# Patient Record
Sex: FEMALE | Race: WHITE | NOT HISPANIC OR LATINO | URBAN - METROPOLITAN AREA
[De-identification: names, ages, dates, MRNs, and addresses within clinical notes are randomized per-mention and may not be internally consistent; named-entity substitution may affect disease eponyms.]

---

## 2017-08-02 ENCOUNTER — EMERGENCY (EMERGENCY)
Facility: HOSPITAL | Age: 62
LOS: 1 days | Discharge: ANOTHER HOSPITAL | End: 2017-08-02
Attending: EMERGENCY MEDICINE
Payer: COMMERCIAL

## 2017-08-02 VITALS
WEIGHT: 169.98 LBS | SYSTOLIC BLOOD PRESSURE: 148 MMHG | TEMPERATURE: 98 F | HEIGHT: 69 IN | HEART RATE: 75 BPM | OXYGEN SATURATION: 97 % | DIASTOLIC BLOOD PRESSURE: 84 MMHG | RESPIRATION RATE: 16 BRPM

## 2017-08-02 VITALS
OXYGEN SATURATION: 98 % | DIASTOLIC BLOOD PRESSURE: 86 MMHG | SYSTOLIC BLOOD PRESSURE: 160 MMHG | TEMPERATURE: 98 F | RESPIRATION RATE: 14 BRPM | HEART RATE: 72 BPM

## 2017-08-02 LAB
ANION GAP SERPL CALC-SCNC: 7 MMOL/L — SIGNIFICANT CHANGE UP (ref 5–17)
BASOPHILS # BLD AUTO: 0.1 K/UL — SIGNIFICANT CHANGE UP (ref 0–0.2)
BASOPHILS NFR BLD AUTO: 0.7 % — SIGNIFICANT CHANGE UP (ref 0–2)
BUN SERPL-MCNC: 18 MG/DL — SIGNIFICANT CHANGE UP (ref 7–18)
CALCIUM SERPL-MCNC: 8.9 MG/DL — SIGNIFICANT CHANGE UP (ref 8.4–10.5)
CHLORIDE SERPL-SCNC: 106 MMOL/L — SIGNIFICANT CHANGE UP (ref 96–108)
CO2 SERPL-SCNC: 27 MMOL/L — SIGNIFICANT CHANGE UP (ref 22–31)
CREAT SERPL-MCNC: 0.74 MG/DL — SIGNIFICANT CHANGE UP (ref 0.5–1.3)
EOSINOPHIL # BLD AUTO: 0 K/UL — SIGNIFICANT CHANGE UP (ref 0–0.5)
EOSINOPHIL NFR BLD AUTO: 0.1 % — SIGNIFICANT CHANGE UP (ref 0–6)
GLUCOSE SERPL-MCNC: 122 MG/DL — HIGH (ref 70–99)
HCT VFR BLD CALC: 41.8 % — SIGNIFICANT CHANGE UP (ref 34.5–45)
HGB BLD-MCNC: 13.8 G/DL — SIGNIFICANT CHANGE UP (ref 11.5–15.5)
LYMPHOCYTES # BLD AUTO: 1.1 K/UL — SIGNIFICANT CHANGE UP (ref 1–3.3)
LYMPHOCYTES # BLD AUTO: 10.2 % — LOW (ref 13–44)
MCHC RBC-ENTMCNC: 30.4 PG — SIGNIFICANT CHANGE UP (ref 27–34)
MCHC RBC-ENTMCNC: 33 GM/DL — SIGNIFICANT CHANGE UP (ref 32–36)
MCV RBC AUTO: 92.3 FL — SIGNIFICANT CHANGE UP (ref 80–100)
MONOCYTES # BLD AUTO: 0.4 K/UL — SIGNIFICANT CHANGE UP (ref 0–0.9)
MONOCYTES NFR BLD AUTO: 3.9 % — SIGNIFICANT CHANGE UP (ref 2–14)
NEUTROPHILS # BLD AUTO: 9 K/UL — HIGH (ref 1.8–7.4)
NEUTROPHILS NFR BLD AUTO: 85 % — HIGH (ref 43–77)
PLATELET # BLD AUTO: 201 K/UL — SIGNIFICANT CHANGE UP (ref 150–400)
POTASSIUM SERPL-MCNC: 3.8 MMOL/L — SIGNIFICANT CHANGE UP (ref 3.5–5.3)
POTASSIUM SERPL-SCNC: 3.8 MMOL/L — SIGNIFICANT CHANGE UP (ref 3.5–5.3)
RBC # BLD: 4.53 M/UL — SIGNIFICANT CHANGE UP (ref 3.8–5.2)
RBC # FLD: 12.1 % — SIGNIFICANT CHANGE UP (ref 10.3–14.5)
SODIUM SERPL-SCNC: 140 MMOL/L — SIGNIFICANT CHANGE UP (ref 135–145)
WBC # BLD: 10.5 K/UL — SIGNIFICANT CHANGE UP (ref 3.8–10.5)
WBC # FLD AUTO: 10.5 K/UL — SIGNIFICANT CHANGE UP (ref 3.8–10.5)

## 2017-08-02 PROCEDURE — 99285 EMERGENCY DEPT VISIT HI MDM: CPT | Mod: 25

## 2017-08-02 PROCEDURE — 27266 TREAT HIP DISLOCATION: CPT | Mod: RT

## 2017-08-02 PROCEDURE — 80048 BASIC METABOLIC PNL TOTAL CA: CPT

## 2017-08-02 PROCEDURE — 85027 COMPLETE CBC AUTOMATED: CPT

## 2017-08-02 PROCEDURE — 96376 TX/PRO/DX INJ SAME DRUG ADON: CPT

## 2017-08-02 PROCEDURE — 73502 X-RAY EXAM HIP UNI 2-3 VIEWS: CPT | Mod: 26,RT

## 2017-08-02 PROCEDURE — 73502 X-RAY EXAM HIP UNI 2-3 VIEWS: CPT

## 2017-08-02 PROCEDURE — 96374 THER/PROPH/DIAG INJ IV PUSH: CPT | Mod: 59

## 2017-08-02 PROCEDURE — 96375 TX/PRO/DX INJ NEW DRUG ADDON: CPT

## 2017-08-02 RX ORDER — MORPHINE SULFATE 50 MG/1
6 CAPSULE, EXTENDED RELEASE ORAL ONCE
Qty: 0 | Refills: 0 | Status: DISCONTINUED | OUTPATIENT
Start: 2017-08-02 | End: 2017-08-02

## 2017-08-02 RX ORDER — MORPHINE SULFATE 50 MG/1
4 CAPSULE, EXTENDED RELEASE ORAL ONCE
Qty: 0 | Refills: 0 | Status: DISCONTINUED | OUTPATIENT
Start: 2017-08-02 | End: 2017-08-02

## 2017-08-02 RX ORDER — PROPOFOL 10 MG/ML
100 INJECTION, EMULSION INTRAVENOUS ONCE
Qty: 0 | Refills: 0 | Status: COMPLETED | OUTPATIENT
Start: 2017-08-02 | End: 2017-08-02

## 2017-08-02 RX ORDER — PROPOFOL 10 MG/ML
1000 INJECTION, EMULSION INTRAVENOUS ONCE
Qty: 0 | Refills: 0 | Status: DISCONTINUED | OUTPATIENT
Start: 2017-08-02 | End: 2017-08-02

## 2017-08-02 RX ORDER — SODIUM CHLORIDE 9 MG/ML
3 INJECTION INTRAMUSCULAR; INTRAVENOUS; SUBCUTANEOUS EVERY 8 HOURS
Qty: 0 | Refills: 0 | Status: DISCONTINUED | OUTPATIENT
Start: 2017-08-02 | End: 2017-08-06

## 2017-08-02 RX ORDER — KETOROLAC TROMETHAMINE 30 MG/ML
30 SYRINGE (ML) INJECTION ONCE
Qty: 0 | Refills: 0 | Status: DISCONTINUED | OUTPATIENT
Start: 2017-08-02 | End: 2017-08-02

## 2017-08-02 RX ADMIN — MORPHINE SULFATE 4 MILLIGRAM(S): 50 CAPSULE, EXTENDED RELEASE ORAL at 06:40

## 2017-08-02 RX ADMIN — Medication 30 MILLIGRAM(S): at 04:46

## 2017-08-02 RX ADMIN — Medication 30 MILLIGRAM(S): at 04:31

## 2017-08-02 RX ADMIN — MORPHINE SULFATE 4 MILLIGRAM(S): 50 CAPSULE, EXTENDED RELEASE ORAL at 04:55

## 2017-08-02 RX ADMIN — SODIUM CHLORIDE 3 MILLILITER(S): 9 INJECTION INTRAMUSCULAR; INTRAVENOUS; SUBCUTANEOUS at 04:22

## 2017-08-02 RX ADMIN — PROPOFOL 100 MILLIGRAM(S): 10 INJECTION, EMULSION INTRAVENOUS at 08:06

## 2017-08-02 RX ADMIN — MORPHINE SULFATE 6 MILLIGRAM(S): 50 CAPSULE, EXTENDED RELEASE ORAL at 11:02

## 2017-08-02 NOTE — ED PROVIDER NOTE - PHYSICAL EXAMINATION
Valeriy Cain DO:   Gen: well appearing without any distress. AAOx3.  HEENT: PERRL, EOMI, atraumatic  Neck: supple  Heart: RRR, S1S2  Lungs: CTA bl. no w/r/r  Abd: soft, nontender, nondistended  Ext: healing incision R hip. R hip fullness and ttp. distal sensation and pulses intact. No erythema. No calf tenderness or edema.   Neuro: Grossly intact. Normal gait. Valeriy Cain DO:   Gen: well appearing without any distress. AAOx3.  HEENT: PERRL, EOMI, atraumatic  Neck: supple  Heart: RRR, S1S2  Lungs: CTA bl. no w/r/r  Abd: soft, nontender, nondistended  Ext: healing incision R hip. R hip fullness lateroposteriorly and ttp. distal sensation and pulses intact. No erythema. No calf tenderness or edema.   Neuro: Grossly intact. Normal gait.

## 2017-08-02 NOTE — ED PROVIDER NOTE - OBJECTIVE STATEMENT
62yoF s/p R hip arthroplasty 1 month ago at [facility] with Dr. Moseley presents with acute onset R hip pain after she leaned over on toilet to clean her foot. No head trauma or other injuries. felt a pop in the hip. Otherwise has been healing well.

## 2017-08-02 NOTE — ED PROCEDURE NOTE - NS_POSTPROCCAREGUIDE_ED_ALL_ED
Patient is now fully awake, with vital signs and temperature stable, hydration is adequate, patients Rajiv’s  score is at baseline (or greater than 8), patient and escort has received  discharge education.

## 2017-08-02 NOTE — ED PROVIDER NOTE - PROGRESS NOTE DETAILS
Courtesy call placed to Dr. Moseley's answering service. spoke with Dr. Moseley, who will see pt tomorrow if reduction successful. Reduction unsuccessful. Attempted to reach out to Dr. Garcia and his PA. the best plan for the pt will likely be transfer to [facility] for continuity of care as Dr. garcia has expressed interest in reoperating prior to unsuccessful reduction. Pt was signed out to me. Pt was accepted by [facility] doctor, Dr. Moseley. Pt was accepted for transfer

## 2017-08-02 NOTE — ED PROVIDER NOTE - MEDICAL DECISION MAKING DETAILS
Hip dislocation prosthetic hip. Hip dislocation prosthetic hip.  Spoke with pt's orthopedist who agrees with ED trial of reduction. This was unsuccessful and efforts to transfer pt to [facility] for operative repair/reduction initiated.

## 2017-08-02 NOTE — ED PROCEDURE NOTE - NS ED PERI VASCULAR NEG
fingers/toes warm to touch/capillary refill time < 2 seconds/no cyanosis of extremity/no swelling/no paresthesia
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
## 2017-08-06 DIAGNOSIS — T84.020A DISLOCATION OF INTERNAL RIGHT HIP PROSTHESIS, INITIAL ENCOUNTER: ICD-10-CM

## 2018-05-23 ENCOUNTER — RESULT REVIEW (OUTPATIENT)
Age: 63
End: 2018-05-23

## 2019-06-04 ENCOUNTER — RESULT REVIEW (OUTPATIENT)
Age: 64
End: 2019-06-04

## 2020-07-06 ENCOUNTER — RESULT REVIEW (OUTPATIENT)
Age: 65
End: 2020-07-06

## 2021-02-12 PROBLEM — Z00.00 ENCOUNTER FOR PREVENTIVE HEALTH EXAMINATION: Status: ACTIVE | Noted: 2021-02-12

## 2021-02-16 ENCOUNTER — APPOINTMENT (OUTPATIENT)
Dept: ORTHOPEDIC SURGERY | Facility: CLINIC | Age: 66
End: 2021-02-16
Payer: COMMERCIAL

## 2021-02-16 VITALS
BODY MASS INDEX: 26.68 KG/M2 | DIASTOLIC BLOOD PRESSURE: 93 MMHG | TEMPERATURE: 97.2 F | HEIGHT: 67.5 IN | SYSTOLIC BLOOD PRESSURE: 164 MMHG | WEIGHT: 172 LBS | HEART RATE: 73 BPM

## 2021-02-16 DIAGNOSIS — Z87.891 PERSONAL HISTORY OF NICOTINE DEPENDENCE: ICD-10-CM

## 2021-02-16 DIAGNOSIS — Z96.641 PRESENCE OF RIGHT ARTIFICIAL HIP JOINT: ICD-10-CM

## 2021-02-16 DIAGNOSIS — Z82.61 FAMILY HISTORY OF ARTHRITIS: ICD-10-CM

## 2021-02-16 DIAGNOSIS — M70.71 OTHER BURSITIS OF HIP, RIGHT HIP: ICD-10-CM

## 2021-02-16 DIAGNOSIS — Z78.9 OTHER SPECIFIED HEALTH STATUS: ICD-10-CM

## 2021-02-16 DIAGNOSIS — Z80.6 FAMILY HISTORY OF LEUKEMIA: ICD-10-CM

## 2021-02-16 DIAGNOSIS — M25.551 PAIN IN RIGHT HIP: ICD-10-CM

## 2021-02-16 DIAGNOSIS — Z96.642 PRESENCE OF LEFT ARTIFICIAL HIP JOINT: ICD-10-CM

## 2021-02-16 PROCEDURE — 73522 X-RAY EXAM HIPS BI 3-4 VIEWS: CPT

## 2021-02-16 PROCEDURE — 99204 OFFICE O/P NEW MOD 45 MIN: CPT | Mod: 95

## 2021-02-16 PROCEDURE — 99072 ADDL SUPL MATRL&STAF TM PHE: CPT

## 2021-02-16 RX ORDER — IBUPROFEN 200 MG/1
TABLET, COATED ORAL
Refills: 0 | Status: ACTIVE | COMMUNITY

## 2021-02-16 RX ORDER — CELECOXIB 200 MG/1
200 CAPSULE ORAL DAILY
Qty: 30 | Refills: 1 | Status: ACTIVE | COMMUNITY
Start: 2021-02-16 | End: 1900-01-01

## 2021-02-16 RX ORDER — MULTIVITAMIN,THER AND MINERALS
TABLET ORAL
Refills: 0 | Status: ACTIVE | COMMUNITY

## 2021-02-16 NOTE — PHYSICAL EXAM
[de-identified] : An AP of the pelvis and a lateral of her right and left hip show bilateral total hip replacements.  She tells us that the right did have a revision and of the head size was increased and she said the screw was removed so may be the acetabular shell was redone and increased.  She has not had any dislocations since.  Both hips show total joint replacements appear to be of good position and well fixed.  This can be further explored with a [facility] MRI.\par \par All back x-rays were reviewed but none taken here today and these show degenerative L4-5 and L5-S1 disc. [de-identified] : Constitutional - the patient is of normal build and nutrition.  The patient remains oriented to person, time, and  place.  Mood is normal. Vital signs as recorded.  The patients gait is WNL but she feels with some some aching intermittently in her right groin.. The patient has satisfactory  balance and can stand on toes and heels.\par \par The patient has no difficulty with respiration. Respiration at rest is a normal rate. The patient is not short of breath and has not become short of breath with short ambulation. There is no audible wheezing. No coughing.\par \par Skin is normal for the patient's age. There are no abnormal masses or lymph nodes which stand out in the lower extremities.\par \par Spine - deep tendon reflexes are symmetric. Motor and sensory are symmetric.  She does have degenerative disc at L4-5 and L5-S1 and is being followed elsewhere for her back.  She has received epidural injections in the past.\par \par \par UPPER EXTREMITIES \par \par Shoulders ROM  is symmetric  and the motion is satisfactory.  There is no significant shoulder pain or limitation in motion which would make using a cane or a walker difficult. Shoulder stability and  strength are satisfactory.\par \par She has a full range of motion of her elbows and wrists.\par \par Circulation appears satisfactory with pedal pulses present.  There is no major edema in the lower legs. No skin tenderness or increased temperature. No major varicosities.\par \par HIP EXAMINATION the abduction and abduction as well as rotation measurements were taken with the hip in flexion.\par \par Motion\par Her hip motion shows flexion right hip 110 degrees left hip 120 degrees, abduction right hip 65 degrees left hip 65 degrees, adduction 20 degrees, left hip 20 degrees, external rotation right hip 65 degrees left hip 65 degrees, internal rotation right hip 10 degrees left hip 10 degrees.  She does get an aching in her right groin with range of motion.\par She guards the strength in her right hip. There is no crepitus in either hip. The alignment of the hips is normal.\par \par \par KNEE EXAMINATION\par \par Motion\par Right Knee has 0 to 135 degrees of motion with good medial  lateral and anterior posterior stability.  There is no major effusion.  There is no Baker's cyst.  There is no significant patellofemoral crepitus.  The patient has satisfactory strength across the knee.               \par Left  Knee   has 0 to 135 degrees of motion with good medial lateral and anterior posterior stability.  There is no major effusion there is no Baker's cyst.  There is no significant patellofemoral crepitus.  The patient has satisfactory strength across the knee.            \par \par Ankle and foot examination\par Of the ankle has normal motion.  There is normal ankle stability.  The patient has no major abnormalities of the foot.\par \par \par \par

## 2021-02-16 NOTE — DISCUSSION/SUMMARY
[de-identified] : At this time the patient is functioning with 2 problems she does have the pain in her right groin which appears to be coming from the hip but not her back.  She has discomfort when trying to do a straight leg raise and symptoms which might indicate she has iliopsoas bursitis and tendinitis.  She may have a tendinitis or soft tissue problem around her hip giving her discomfort because the hip looks well fixed.  Both hips were done through the anterior approach.  I suggest a [facility] MRI to better evaluate her hip this might show and ensure she has good fixation of the femoral component and I will also will evaluate her more first the soft tissues and tendinitis is about her hip if she does have a tendinitis and it could be injected by radiology at that time.  In the meantime she will try now Celebrex 200 mg a day and supplement with Tylenol.

## 2021-02-16 NOTE — HISTORY OF PRESENT ILLNESS
[Other Location: e.g. School (Enter Location, City,State)___] : at [unfilled], at the time of the visit. [Other Location: e.g. Home (Enter Location, City,State)___] : at [unfilled] [Verbal consent obtained from patient] : the patient, [unfilled] [de-identified] : Ms. SUHAS POSEY is a 65 year female who presents to office complaining of right hip pain x 3-4 years.\par H/o right THR July 2017 by Dr. Abdelrahman Moseley at [facility] followed by dislocation 1 month later which required revision surgery. in the month in between the initial surgery and her dislocation, her hip was doing great. However, following her dislocation, her right hip "hasn't felt right".\par Over the years, Dr. Moseley has said that "everything looks good" through her x-rays and physical exam.\par He eventually referred her to pain management and she has seen Dr. Roman and Dr. Bennett for this issue as well as chronic lower back pain, where she has gotten epidural injections, Gabapentin, and other pain medicines which she is not currently being prescribed.\par She has not done physical therapy for this issue, as most recently, Dr. Moseley said that PT "wouldn't help her".\par Currently, patient's pain is a sharp sensation in the right groin with some discomfort in her right buttock. Denies radiating pain or distal neuropathy.\par Pain is present at all times but is worse with activities such as stairs and getting up from a chair.\par All review of systems, family history, social history, surgical history, past medical history, medications, and allergies not previously stated as positive are negative. They were reviewed by me today with the patient and documented accordingly.

## 2021-03-06 NOTE — ED ADULT TRIAGE NOTE - NS ED NOTE AC HIGH RISK COUNTRIES
Bed: 09  Expected date: 3/6/21  Expected time:   Means of arrival: [facility]  Comments:  Trauma - CFD    No

## 2021-03-13 ENCOUNTER — APPOINTMENT (OUTPATIENT)
Dept: MRI IMAGING | Facility: CLINIC | Age: 66
End: 2021-03-13
Payer: COMMERCIAL

## 2021-03-13 ENCOUNTER — OUTPATIENT (OUTPATIENT)
Dept: OUTPATIENT SERVICES | Facility: HOSPITAL | Age: 66
LOS: 1 days | End: 2021-03-13
Payer: COMMERCIAL

## 2021-03-13 ENCOUNTER — RESULT REVIEW (OUTPATIENT)
Age: 66
End: 2021-03-13

## 2021-03-13 DIAGNOSIS — M25.551 PAIN IN RIGHT HIP: ICD-10-CM

## 2021-03-13 DIAGNOSIS — Z96.641 PRESENCE OF RIGHT ARTIFICIAL HIP JOINT: ICD-10-CM

## 2021-03-13 PROCEDURE — 73721 MRI JNT OF LWR EXTRE W/O DYE: CPT

## 2021-03-13 PROCEDURE — 73721 MRI JNT OF LWR EXTRE W/O DYE: CPT | Mod: 26,RT

## 2021-03-19 ENCOUNTER — NON-APPOINTMENT (OUTPATIENT)
Age: 66
End: 2021-03-19

## 2021-04-12 ENCOUNTER — NON-APPOINTMENT (OUTPATIENT)
Age: 66
End: 2021-04-12

## 2021-04-21 ENCOUNTER — RESULT REVIEW (OUTPATIENT)
Age: 66
End: 2021-04-21

## 2021-04-21 ENCOUNTER — APPOINTMENT (OUTPATIENT)
Dept: ULTRASOUND IMAGING | Facility: CLINIC | Age: 66
End: 2021-04-21
Payer: COMMERCIAL

## 2021-04-21 ENCOUNTER — OUTPATIENT (OUTPATIENT)
Dept: OUTPATIENT SERVICES | Facility: HOSPITAL | Age: 66
LOS: 1 days | End: 2021-04-21
Payer: COMMERCIAL

## 2021-04-21 DIAGNOSIS — Z96.641 PRESENCE OF RIGHT ARTIFICIAL HIP JOINT: ICD-10-CM

## 2021-04-21 PROCEDURE — 20611 DRAIN/INJ JOINT/BURSA W/US: CPT

## 2021-04-21 PROCEDURE — 20611 DRAIN/INJ JOINT/BURSA W/US: CPT | Mod: RT

## 2021-05-26 ENCOUNTER — APPOINTMENT (OUTPATIENT)
Dept: ULTRASOUND IMAGING | Facility: CLINIC | Age: 66
End: 2021-05-26
Payer: COMMERCIAL

## 2021-05-26 ENCOUNTER — OUTPATIENT (OUTPATIENT)
Dept: OUTPATIENT SERVICES | Facility: HOSPITAL | Age: 66
LOS: 1 days | End: 2021-05-26
Payer: COMMERCIAL

## 2021-05-26 ENCOUNTER — RESULT REVIEW (OUTPATIENT)
Age: 66
End: 2021-05-26

## 2021-05-26 DIAGNOSIS — Z96.641 PRESENCE OF RIGHT ARTIFICIAL HIP JOINT: ICD-10-CM

## 2021-05-26 PROCEDURE — 20611 DRAIN/INJ JOINT/BURSA W/US: CPT | Mod: RT

## 2021-05-26 PROCEDURE — 20611 DRAIN/INJ JOINT/BURSA W/US: CPT

## 2021-06-23 DIAGNOSIS — M67.951 UNSPECIFIED DISORDER OF SYNOVIUM AND TENDON, RIGHT THIGH: ICD-10-CM

## 2021-08-16 ENCOUNTER — RESULT REVIEW (OUTPATIENT)
Age: 66
End: 2021-08-16

## 2021-09-01 ENCOUNTER — APPOINTMENT (OUTPATIENT)
Dept: ULTRASOUND IMAGING | Facility: CLINIC | Age: 66
End: 2021-09-01
Payer: COMMERCIAL

## 2021-09-01 ENCOUNTER — OUTPATIENT (OUTPATIENT)
Dept: OUTPATIENT SERVICES | Facility: HOSPITAL | Age: 66
LOS: 1 days | End: 2021-09-01
Payer: COMMERCIAL

## 2021-09-01 ENCOUNTER — RESULT REVIEW (OUTPATIENT)
Age: 66
End: 2021-09-01

## 2021-09-01 DIAGNOSIS — M70.71 OTHER BURSITIS OF HIP, RIGHT HIP: ICD-10-CM

## 2021-09-01 DIAGNOSIS — Z00.8 ENCOUNTER FOR OTHER GENERAL EXAMINATION: ICD-10-CM

## 2021-09-01 DIAGNOSIS — M67.951 UNSPECIFIED DISORDER OF SYNOVIUM AND TENDON, RIGHT THIGH: ICD-10-CM

## 2021-09-01 PROCEDURE — 20611 DRAIN/INJ JOINT/BURSA W/US: CPT | Mod: RT

## 2021-09-01 PROCEDURE — 20611 DRAIN/INJ JOINT/BURSA W/US: CPT

## 2021-09-09 ENCOUNTER — NON-APPOINTMENT (OUTPATIENT)
Age: 66
End: 2021-09-09

## 2022-10-03 ENCOUNTER — RESULT REVIEW (OUTPATIENT)
Age: 67
End: 2022-10-03

## 2022-10-03 ENCOUNTER — APPOINTMENT (OUTPATIENT)
Dept: OBGYN | Facility: CLINIC | Age: 67
End: 2022-10-03

## 2022-10-03 PROCEDURE — 99213 OFFICE O/P EST LOW 20 MIN: CPT | Mod: 25

## 2022-10-03 PROCEDURE — 82270 OCCULT BLOOD FECES: CPT

## 2022-10-03 PROCEDURE — G0101: CPT

## 2022-10-03 PROCEDURE — 81002 URINALYSIS NONAUTO W/O SCOPE: CPT

## 2023-06-23 ENCOUNTER — EMERGENCY (EMERGENCY)
Facility: HOSPITAL | Age: 68
LOS: 1 days | Discharge: HOME | End: 2023-06-23
Attending: EMERGENCY MEDICINE
Payer: MEDICARE

## 2023-06-23 VITALS
SYSTOLIC BLOOD PRESSURE: 118 MMHG | DIASTOLIC BLOOD PRESSURE: 67 MMHG | OXYGEN SATURATION: 100 % | HEART RATE: 74 BPM | RESPIRATION RATE: 18 BRPM

## 2023-06-23 VITALS
WEIGHT: 179.9 LBS | TEMPERATURE: 98 F | DIASTOLIC BLOOD PRESSURE: 44 MMHG | HEART RATE: 54 BPM | OXYGEN SATURATION: 94 % | RESPIRATION RATE: 18 BRPM | SYSTOLIC BLOOD PRESSURE: 67 MMHG | HEIGHT: 67 IN

## 2023-06-23 DIAGNOSIS — Z96.642 PRESENCE OF LEFT ARTIFICIAL HIP JOINT: Chronic | ICD-10-CM

## 2023-06-23 DIAGNOSIS — Z96.641 PRESENCE OF RIGHT ARTIFICIAL HIP JOINT: Chronic | ICD-10-CM

## 2023-06-23 LAB
ALBUMIN SERPL ELPH-MCNC: 4.3 G/DL — SIGNIFICANT CHANGE UP (ref 3.3–5)
ALP SERPL-CCNC: 116 U/L — SIGNIFICANT CHANGE UP (ref 40–120)
ALT FLD-CCNC: 33 U/L — SIGNIFICANT CHANGE UP (ref 10–45)
ANION GAP SERPL CALC-SCNC: 17 MMOL/L — SIGNIFICANT CHANGE UP (ref 5–17)
APTT BLD: 26.7 SEC — LOW (ref 27.5–35.5)
AST SERPL-CCNC: 32 U/L — SIGNIFICANT CHANGE UP (ref 10–40)
BASOPHILS # BLD AUTO: 0.04 K/UL — SIGNIFICANT CHANGE UP (ref 0–0.2)
BASOPHILS NFR BLD AUTO: 0.9 % — SIGNIFICANT CHANGE UP (ref 0–2)
BILIRUB SERPL-MCNC: 0.2 MG/DL — SIGNIFICANT CHANGE UP (ref 0.2–1.2)
BUN SERPL-MCNC: 16 MG/DL — SIGNIFICANT CHANGE UP (ref 7–23)
CALCIUM SERPL-MCNC: 9.3 MG/DL — SIGNIFICANT CHANGE UP (ref 8.4–10.5)
CHLORIDE SERPL-SCNC: 101 MMOL/L — SIGNIFICANT CHANGE UP (ref 96–108)
CO2 SERPL-SCNC: 21 MMOL/L — LOW (ref 22–31)
CREAT SERPL-MCNC: 0.81 MG/DL — SIGNIFICANT CHANGE UP (ref 0.5–1.3)
EGFR: 80 ML/MIN/1.73M2 — SIGNIFICANT CHANGE UP
EOSINOPHIL # BLD AUTO: 0.01 K/UL — SIGNIFICANT CHANGE UP (ref 0–0.5)
EOSINOPHIL NFR BLD AUTO: 0.2 % — SIGNIFICANT CHANGE UP (ref 0–6)
GLUCOSE SERPL-MCNC: 134 MG/DL — HIGH (ref 70–99)
HCT VFR BLD CALC: 40.9 % — SIGNIFICANT CHANGE UP (ref 34.5–45)
HGB BLD-MCNC: 13.2 G/DL — SIGNIFICANT CHANGE UP (ref 11.5–15.5)
IMM GRANULOCYTES NFR BLD AUTO: 1.1 % — HIGH (ref 0–0.9)
INR BLD: 0.97 RATIO — SIGNIFICANT CHANGE UP (ref 0.88–1.16)
LYMPHOCYTES # BLD AUTO: 1.46 K/UL — SIGNIFICANT CHANGE UP (ref 1–3.3)
LYMPHOCYTES # BLD AUTO: 33.6 % — SIGNIFICANT CHANGE UP (ref 13–44)
MCHC RBC-ENTMCNC: 29.5 PG — SIGNIFICANT CHANGE UP (ref 27–34)
MCHC RBC-ENTMCNC: 32.3 GM/DL — SIGNIFICANT CHANGE UP (ref 32–36)
MCV RBC AUTO: 91.3 FL — SIGNIFICANT CHANGE UP (ref 80–100)
MONOCYTES # BLD AUTO: 0.48 K/UL — SIGNIFICANT CHANGE UP (ref 0–0.9)
MONOCYTES NFR BLD AUTO: 11 % — SIGNIFICANT CHANGE UP (ref 2–14)
NEUTROPHILS # BLD AUTO: 2.31 K/UL — SIGNIFICANT CHANGE UP (ref 1.8–7.4)
NEUTROPHILS NFR BLD AUTO: 53.2 % — SIGNIFICANT CHANGE UP (ref 43–77)
NRBC # BLD: 0 /100 WBCS — SIGNIFICANT CHANGE UP (ref 0–0)
PLATELET # BLD AUTO: 182 K/UL — SIGNIFICANT CHANGE UP (ref 150–400)
POTASSIUM SERPL-MCNC: 3.8 MMOL/L — SIGNIFICANT CHANGE UP (ref 3.5–5.3)
POTASSIUM SERPL-SCNC: 3.8 MMOL/L — SIGNIFICANT CHANGE UP (ref 3.5–5.3)
PROT SERPL-MCNC: 7.2 G/DL — SIGNIFICANT CHANGE UP (ref 6–8.3)
PROTHROM AB SERPL-ACNC: 11.1 SEC — SIGNIFICANT CHANGE UP (ref 10.5–13.4)
RBC # BLD: 4.48 M/UL — SIGNIFICANT CHANGE UP (ref 3.8–5.2)
RBC # FLD: 13 % — SIGNIFICANT CHANGE UP (ref 10.3–14.5)
SODIUM SERPL-SCNC: 139 MMOL/L — SIGNIFICANT CHANGE UP (ref 135–145)
WBC # BLD: 4.35 K/UL — SIGNIFICANT CHANGE UP (ref 3.8–10.5)
WBC # FLD AUTO: 4.35 K/UL — SIGNIFICANT CHANGE UP (ref 3.8–10.5)

## 2023-06-23 PROCEDURE — 73130 X-RAY EXAM OF HAND: CPT

## 2023-06-23 PROCEDURE — 85025 COMPLETE CBC W/AUTO DIFF WBC: CPT

## 2023-06-23 PROCEDURE — 73110 X-RAY EXAM OF WRIST: CPT | Mod: 26,LT

## 2023-06-23 PROCEDURE — 76377 3D RENDER W/INTRP POSTPROCES: CPT | Mod: 26

## 2023-06-23 PROCEDURE — 86900 BLOOD TYPING SEROLOGIC ABO: CPT

## 2023-06-23 PROCEDURE — 73200 CT UPPER EXTREMITY W/O DYE: CPT | Mod: 26,LT,MA

## 2023-06-23 PROCEDURE — 73130 X-RAY EXAM OF HAND: CPT | Mod: 26,LT

## 2023-06-23 PROCEDURE — 80053 COMPREHEN METABOLIC PANEL: CPT

## 2023-06-23 PROCEDURE — 96375 TX/PRO/DX INJ NEW DRUG ADDON: CPT | Mod: XU

## 2023-06-23 PROCEDURE — 86850 RBC ANTIBODY SCREEN: CPT

## 2023-06-23 PROCEDURE — 25605 CLTX DST RDL FX/EPHYS SEP W/: CPT | Mod: LT

## 2023-06-23 PROCEDURE — 73090 X-RAY EXAM OF FOREARM: CPT | Mod: 26,LT,77

## 2023-06-23 PROCEDURE — 73080 X-RAY EXAM OF ELBOW: CPT

## 2023-06-23 PROCEDURE — 73110 X-RAY EXAM OF WRIST: CPT | Mod: 26,LT,77

## 2023-06-23 PROCEDURE — 93005 ELECTROCARDIOGRAM TRACING: CPT | Mod: XU

## 2023-06-23 PROCEDURE — 85730 THROMBOPLASTIN TIME PARTIAL: CPT

## 2023-06-23 PROCEDURE — 99285 EMERGENCY DEPT VISIT HI MDM: CPT | Mod: 25

## 2023-06-23 PROCEDURE — 71045 X-RAY EXAM CHEST 1 VIEW: CPT

## 2023-06-23 PROCEDURE — 96374 THER/PROPH/DIAG INJ IV PUSH: CPT | Mod: XU

## 2023-06-23 PROCEDURE — 85610 PROTHROMBIN TIME: CPT

## 2023-06-23 PROCEDURE — 76377 3D RENDER W/INTRP POSTPROCES: CPT

## 2023-06-23 PROCEDURE — 73080 X-RAY EXAM OF ELBOW: CPT | Mod: 26,LT

## 2023-06-23 PROCEDURE — 99285 EMERGENCY DEPT VISIT HI MDM: CPT

## 2023-06-23 PROCEDURE — 36415 COLL VENOUS BLD VENIPUNCTURE: CPT

## 2023-06-23 PROCEDURE — 71045 X-RAY EXAM CHEST 1 VIEW: CPT | Mod: 26

## 2023-06-23 PROCEDURE — 73200 CT UPPER EXTREMITY W/O DYE: CPT | Mod: MA

## 2023-06-23 PROCEDURE — 73090 X-RAY EXAM OF FOREARM: CPT | Mod: 26,LT

## 2023-06-23 PROCEDURE — 86901 BLOOD TYPING SEROLOGIC RH(D): CPT

## 2023-06-23 PROCEDURE — 96376 TX/PRO/DX INJ SAME DRUG ADON: CPT | Mod: XU

## 2023-06-23 PROCEDURE — 73110 X-RAY EXAM OF WRIST: CPT

## 2023-06-23 PROCEDURE — 73090 X-RAY EXAM OF FOREARM: CPT

## 2023-06-23 RX ORDER — SODIUM CHLORIDE 9 MG/ML
1000 INJECTION INTRAMUSCULAR; INTRAVENOUS; SUBCUTANEOUS ONCE
Refills: 0 | Status: COMPLETED | OUTPATIENT
Start: 2023-06-23 | End: 2023-06-23

## 2023-06-23 RX ORDER — OXYCODONE HYDROCHLORIDE 5 MG/1
1 TABLET ORAL
Qty: 9 | Refills: 0
Start: 2023-06-23 | End: 2023-06-25

## 2023-06-23 RX ORDER — ACETAMINOPHEN 500 MG
1000 TABLET ORAL ONCE
Refills: 0 | Status: COMPLETED | OUTPATIENT
Start: 2023-06-23 | End: 2023-06-23

## 2023-06-23 RX ORDER — MORPHINE SULFATE 50 MG/1
0.5 CAPSULE, EXTENDED RELEASE ORAL
Qty: 5 | Refills: 0
Start: 2023-06-23 | End: 2023-06-26

## 2023-06-23 RX ORDER — MORPHINE SULFATE 50 MG/1
4 CAPSULE, EXTENDED RELEASE ORAL
Qty: 60 | Refills: 0
Start: 2023-06-23 | End: 2023-06-27

## 2023-06-23 RX ORDER — HYDROMORPHONE HYDROCHLORIDE 2 MG/ML
1 INJECTION INTRAMUSCULAR; INTRAVENOUS; SUBCUTANEOUS ONCE
Refills: 0 | Status: DISCONTINUED | OUTPATIENT
Start: 2023-06-23 | End: 2023-06-23

## 2023-06-23 RX ORDER — MORPHINE SULFATE 50 MG/1
4 CAPSULE, EXTENDED RELEASE ORAL ONCE
Refills: 0 | Status: DISCONTINUED | OUTPATIENT
Start: 2023-06-23 | End: 2023-06-23

## 2023-06-23 RX ADMIN — MORPHINE SULFATE 4 MILLIGRAM(S): 50 CAPSULE, EXTENDED RELEASE ORAL at 01:08

## 2023-06-23 RX ADMIN — SODIUM CHLORIDE 1000 MILLILITER(S): 9 INJECTION INTRAMUSCULAR; INTRAVENOUS; SUBCUTANEOUS at 01:08

## 2023-06-23 RX ADMIN — HYDROMORPHONE HYDROCHLORIDE 1 MILLIGRAM(S): 2 INJECTION INTRAMUSCULAR; INTRAVENOUS; SUBCUTANEOUS at 02:12

## 2023-06-23 RX ADMIN — Medication 400 MILLIGRAM(S): at 01:20

## 2023-06-23 RX ADMIN — MORPHINE SULFATE 4 MILLIGRAM(S): 50 CAPSULE, EXTENDED RELEASE ORAL at 07:43

## 2023-06-23 NOTE — ED PROVIDER NOTE - PHYSICAL EXAMINATION
Gen: moderate distress, AOx3, able to make needs known, non-toxic  HEENT: EOMI, oral mucosa moist, normal conjunctiva. No head trauma visualized, no cervical spine tenderness. No bruising, abrasions, or swelling over the left face, nontender with palpation.   CV: pulses bilaterally, no sternal tenderness, no clavicle tenderness   Abd: soft, NTND, no guarding, no CVA tenderness   MSK: no spinal tenderness, no tenderness with palpation of the RUE and BLE, no hip or pelvis tenderness. +deformity of the left wrist/distal radius and ulna, tender to palpation. able to move fingers, pulses equal in BUE, sensation intact.   Neuro: No focal sensory or motor deficits  Skin: Warm, well perfused, no rash, no bruises   Psych: pained affect

## 2023-06-23 NOTE — CONSULT NOTE ADULT - SUBJECTIVE AND OBJECTIVE BOX
*** CHARTING IN PROGRESS - INCOMPLETE NOTE ***    68F community-ambulatory presents complaining of Left wrist pain status-post mechanical fall. Patient states she tripped on curb resulting in FOOSH injury. Endorses mild facial strike, denies other head-strike or loss of consciousness. Denies numbness tingling in the digits of the LUE. No other pain/injuries. Denies fevers/chills/HA/CP/SOB. .     HEALTH ISSUES - PROBLEM Dx:        MEDICATIONS  (STANDING):    Allergies    No Known Allergies    Intolerances      Imaging:   XR Left Hand/Wrist/Elbow: Volarly and radially displaced fracture dislocation of the Left distal radius with an accompanying ulnar styloid fracture.                         13.2   4.35  )-----------( 182      ( 23 Jun 2023 00:59 )             40.9     23 Jun 2023 00:59    139    |  101    |  16     ----------------------------<  134    3.8     |  21     |  0.81     Ca    9.3        23 Jun 2023 00:59    TPro  7.2    /  Alb  4.3    /  TBili  0.2    /  DBili  x      /  AST  32     /  ALT  33     /  AlkPhos  116    23 Jun 2023 00:59    PT/INR - ( 23 Jun 2023 00:59 )   PT: 11.1 sec;   INR: 0.97 ratio         PTT - ( 23 Jun 2023 00:59 )  PTT:26.7 sec  Vital Signs Last 24 Hrs  T(C): 36.9 (06-23-23 @ 07:40), Max: 37 (06-23-23 @ 05:20)  T(F): 98.4 (06-23-23 @ 07:40), Max: 98.6 (06-23-23 @ 05:20)  HR: 73 (06-23-23 @ 07:40) (54 - 82)  BP: 123/72 (06-23-23 @ 07:40) (67/44 - 139/87)  BP(mean): 93 (06-23-23 @ 01:06) (93 - 93)  RR: 18 (06-23-23 @ 07:40) (18 - 19)  SpO2: 100% (06-23-23 @ 07:40) (94% - 100%)    Physical Exam  Gen: NAD, awake and alert.  LUE:   Wrist swelling noted, with obvious deformity localized over the volar radial aspect of the the joint with shortening of the wrist.  Skin intact aside from small superficial abrasion overlying the knuckle over the MCP of the 5th digit.  +TTP distal radius.   Compartments soft/compressive.   Extremity warm/well perfused.   No elbow or shoulder tenderness or swelling.   2+ radial pulse  +AIN/PIN/M/U/R,   SILT C5-T1      Procedure: 10 cc 1% lidocaine injected under sterile procedure into L/R Distal radius fracture siite. Time was allowed to achieve anesthetic effect.  Closed reduction performed. Placed in well padded sugartong splint, molded appropriately. Post procedure imaging obtained. Post procedure exam unchanged, NV intact, able to wiggles all fingers, SILT distally.     ASSESSMENT:   68F with Left distal radius fracture dislocation with and associated ulnar styloid fracture.     PLAN:  Pain control.   NWB LUE in splint, keep c/d/I,   Ice/elevation  Si/sx compartment syndrome discussed with patient, told to return to ED if exhibit any  Possible need for surgical intervention in future discussed, all questions answered  Follow up with Dr. Quiroz within 3-5 days, call office for appointment.    Above note pending post-reduction radiography and confirmation of plan with attending surgeon.   Will update ED accordingly.       *** CHARTING IN PROGRESS - INCOMPLETE NOTE *** 68F community-ambulatory presents complaining of Left wrist pain status-post mechanical fall. Patient states she tripped on curb resulting in FOOSH injury. Endorses mild facial strike, denies other head-strike or loss of consciousness. Denies numbness tingling in the digits of the LUE. No other pain/injuries. Denies fevers/chills/HA/CP/SOB. .     HEALTH ISSUES - PROBLEM Dx:        MEDICATIONS  (STANDING):    Allergies    No Known Allergies    Intolerances      Imaging:   XR Left Hand/Wrist/Elbow: Volarly and radially displaced fracture dislocation of the Left distal radius with an accompanying ulnar styloid fracture.                         13.2   4.35  )-----------( 182      ( 23 Jun 2023 00:59 )             40.9     23 Jun 2023 00:59    139    |  101    |  16     ----------------------------<  134    3.8     |  21     |  0.81     Ca    9.3        23 Jun 2023 00:59    TPro  7.2    /  Alb  4.3    /  TBili  0.2    /  DBili  x      /  AST  32     /  ALT  33     /  AlkPhos  116    23 Jun 2023 00:59    PT/INR - ( 23 Jun 2023 00:59 )   PT: 11.1 sec;   INR: 0.97 ratio         PTT - ( 23 Jun 2023 00:59 )  PTT:26.7 sec  Vital Signs Last 24 Hrs  T(C): 36.9 (06-23-23 @ 07:40), Max: 37 (06-23-23 @ 05:20)  T(F): 98.4 (06-23-23 @ 07:40), Max: 98.6 (06-23-23 @ 05:20)  HR: 73 (06-23-23 @ 07:40) (54 - 82)  BP: 123/72 (06-23-23 @ 07:40) (67/44 - 139/87)  BP(mean): 93 (06-23-23 @ 01:06) (93 - 93)  RR: 18 (06-23-23 @ 07:40) (18 - 19)  SpO2: 100% (06-23-23 @ 07:40) (94% - 100%)    Physical Exam  Gen: NAD, awake and alert.  LUE:   Wrist swelling noted, with obvious deformity localized over the volar radial aspect of the the joint with shortening of the wrist.  Skin intact aside from small superficial abrasion overlying the knuckle over the MCP of the 5th digit.  +TTP distal radius.   Compartments soft/compressive.   Extremity warm/well perfused.   No elbow or shoulder tenderness or swelling.   2+ radial pulse  +AIN/PIN/M/U/R,   SILT C5-T1      Procedure: 10 cc 1% lidocaine injected under sterile procedure into L/R Distal radius fracture siite. Time was allowed to achieve anesthetic effect.  Closed reduction performed. Placed in well padded sugartong splint, molded appropriately. Post procedure imaging obtained. Post procedure exam unchanged, NV intact, able to wiggles all fingers, SILT distally.     ASSESSMENT:   68F with Left distal radius fracture dislocation with and associated ulnar styloid fracture.     PLAN:  Pain control.   NWB LUE in splint, keep c/d/I,   Ice/elevation  Si/sx compartment syndrome discussed with patient, told to return to ED if exhibit any  Possible need for surgical intervention in future discussed, all questions answered  Follow up with Dr. Quiroz within 1 week days, call office for appointment.  Discussed with Dr. Quiroz who is aware of and in agreement with the above plan

## 2023-06-23 NOTE — ED PROVIDER NOTE - NS ED ROS FT
GENERAL: No fever, no chills  EYES: No change in vision  HEENT: No trouble swallowing or speaking  CARDIAC: No chest pain  PULMONARY: No cough, no SOB  GI: No abdominal pain, no nausea, no vomiting, no diarrhea, no constipation  : No changes in urination  SKIN: No rashes  NEURO: No headache, no numbness  MSK: +left wrist pain  Otherwise as HPI or negative.

## 2023-06-23 NOTE — ED PROVIDER NOTE - PROGRESS NOTE DETAILS
Jimbo, PGY2 - ortho paged Jimbo, PGY2 - Ortho Dean consulted for distal radial/ulnar fracture with dislocation, will come see patient. Jimbo, PGY2 - ortho re-paged Yessy Enrique DO PGY-2  Ortho has splinted the wrist and evaluated the patient's post-reduction CT scan. Discussed precautions with patient.     Discussed the plan for discharge home with the patient. Advised return precautions for any alarming or worsening symptoms, or any other concerns. Recommended that the patient call their primary care doctor today, inform them of their visit to the ER, and to obtain repeat evaluation from their PCP in the next 1-3 days. Patient expresses understanding and agrees with the plan for follow up. At the time of discharge the patient remained stable, in no acute distress. Yessy Enrique, DO PGY-2  Patient signed out to me pending ortho recommendations

## 2023-06-23 NOTE — ED PROVIDER NOTE - CARE PROVIDER_API CALL
Neel Quiroz  Orthopaedic Trauma  [address], [address]  [place] [address]  Phone: [phone]  Fax: [phone]  Follow Up Time: 1-3 Days

## 2023-06-23 NOTE — ED ADULT NURSE REASSESSMENT NOTE - NS ED NURSE REASSESS COMMENT FT1
Received awake alert and orientedx4 Resp even and nonlab Daughter at bedside. L wrist swollen Palp Rad pulse Cap refill wnl Pos sensation Able to wiggle fingers Ortho at bedside to reduce wrist

## 2023-06-23 NOTE — ED PROVIDER NOTE - CLINICAL SUMMARY MEDICAL DECISION MAKING FREE TEXT BOX
Jimbo, PGY2 - 69yo woman presenting with pain and deformity of the LUE post trip and fall. labs, xrays, meds. ortho to see, will follow recs. patient has a history of vasovagal syncope, feels this was similar to her prior episodes, denies actual head strike, no signs of trauma on exam, will do lab workup for syncope, at this time low clinical suspicion for intracranial pathology, below testing threshold. *The above represents an initial assessment/impression. Please refer to progress notes for potential changes in patient clinical course*

## 2023-06-23 NOTE — ED PROVIDER NOTE - OBJECTIVE STATEMENT
67yo woman with PMH HTN, vasovagal syncope, presenting with left wrist pain after a trip and fall at midnight. Patient tried to break her fall with her outstretched left hand, patient denies hitting head but states she thinks that her left face slid across ground. No LOC at that time, patient required help getting up from the floor and was ambulating by herself at scene. Due to the pain, patient vaso-vagaled a couple of times and lost consciousness on the way to the ER. Denies N/V, chest pain, blurry vision. Although BP was low in triage, in room patient SBP >110 67yo woman with PMH HTN, vasovagal syncope, presenting with left wrist pain after a trip and fall at midnight. Patient tried to break her fall with her outstretched left hand, patient denies hitting head but states she thinks that her left face slid across ground. No LOC at that time, patient required help getting up from the floor and was ambulating by herself at scene. Due to the pain, patient vaso-vagaled a couple of times and lost consciousness on the way to the ER. Denies N/V, chest pain, blurry vision. Although BP was low in triage, in room patient SBP >110    Attendin-year-old female presents with left wrist pain after trip and fall and landed on the left wrist.  She has an obvious deformity to the wrist.  She did have a fainting spell due to the pain earlier on the way here to the hospital.  Now she is awake and alert has no syncope or near syncope symptoms at all.

## 2023-06-23 NOTE — ED PROVIDER NOTE - NSFOLLOWUPINSTRUCTIONS_ED_ALL_ED_FT
Wrist Fracture in Adults  WHAT YOU NEED TO KNOW:  A wrist fracture is a break in one or more of the bones in your wrist.   Adult Arm Bones  DISCHARGE INSTRUCTIONS:  Seek care immediately if:   •Your pain gets worse or does not get better after you take pain medicine.  •Your cast or splint breaks, gets wet, or is damaged.  •Your hand or fingers feel numb or cold.  •Your hand or fingers turn white or blue.  •Your splint or cast feels too tight.  •You have more pain or swelling after the cast or splint is put on.  Call your doctor if:   •You have a fever.  •There is a foul smell or blood coming from under the cast.  •You have questions or concerns about your condition or care.  Medicines: You may need any of the following:   •Prescription pain medicine may be given. Ask your healthcare provider how to take this medicine safely. Some prescription pain medicines contain acetaminophen. Do not take other medicines that contain acetaminophen without talking to your healthcare provider. Too much acetaminophen may cause liver damage. Prescription pain medicine may cause constipation. Ask your healthcare provider how to prevent or treat constipation.   •NSAIDs, such as ibuprofen, help decrease swelling, pain, and fever. NSAIDs can cause stomach bleeding or kidney problems in certain people. If you take blood thinner medicine, always ask your healthcare provider if NSAIDs are safe for you. Always read the medicine label and follow directions.  •Acetaminophen decreases pain and fever. It is available without a doctor's order. Ask how much to take and how often to take it. Follow directions. Read the labels of all other medicines you are using to see if they also contain acetaminophen, or ask your doctor or pharmacist. Acetaminophen can cause liver damage if not taken correctly. Do not use more than 4 grams (4,000 milligrams) total of acetaminophen in one day.   •Take your medicine as directed. Contact your healthcare provider if you think your medicine is not helping or if you have side effects. Tell him or her if you are allergic to any medicine. Keep a list of the medicines, vitamins, and herbs you take. Include the amounts, and when and why you take them. Bring the list or the pill bottles to follow-up visits. Carry your medicine list with you in case of an emergency.  Self-care:   •Rest as much as possible. Do not play contact sports until the healthcare provider says it is okay.   •Apply ice on your wrist for 15 to 20 minutes every hour or as directed. Use an ice pack, or put crushed ice in a plastic bag. Cover it with a towel before you place it on your skin. Ice helps prevent tissue damage and decreases swelling and pain.  •Elevate your wrist above the level of your heart as often as possible. This will help decrease swelling and pain. Prop your wrist on pillows or blankets to keep it elevated comfortably.  Cast or splint care:   •You may take a bath or shower as directed. Do not let your cast or splint get wet. Before bathing, cover the cast or splint with 2 plastic trash bags. Tape the bags to your skin above the cast or splint to seal out the water. Keep your arm out of the water in case the bag breaks. If a plaster cast gets wet and soft, call your healthcare provider.  •Check the skin around the cast or splint every day. You may put lotion on any red or sore areas.  •Do not push down or lean on the cast or brace because it may break.  •Do not scratch the skin under the cast by putting a sharp or pointed object inside the cast.  Go to physical therapy as directed: You may need physical therapy after your wrist heals and the cast is removed. A physical therapist can teach you exercises to help improve movement and strength and to decrease pain.  Follow up with your doctor or bone specialist as directed: You may need to return to have your cast removed. You may also need an x-ray to check how well the bone has healed. Write down your questions so you remember to ask them during your visits.

## 2023-06-23 NOTE — ED ADULT NURSE NOTE - NSFALLRISKINTERV_ED_ALL_ED
Provide visual cue: yellow wristband, yellow gown, etc/Call bell, personal items and telephone in reach/Instruct patient to call for assistance before getting out of bed/chair/stretcher/Physically safe environment - no spills, clutter or unnecessary equipment/Purposeful Proactive Rounding/Room/bathroom lighting operational, light cord in reach

## 2023-06-23 NOTE — ED ADULT TRIAGE NOTE - NS ED TRIAGE AVPU SCALE
No  Do you have difficulty driving, watching TV, or doing any of your daily activities because of your eyesight?: No  Have you had an eye exam within the past year?: Yes  Hearing/Vision Interventions:  · Hearing concerns:  patient declines any further evaluation/treatment for hearing issues    Personalized Preventive Plan   Current Health Maintenance Status  Immunization History   Administered Date(s) Administered    Influenza Vaccine, unspecified formulation 10/01/2016, 10/15/2017    Influenza Virus Vaccine 10/01/2016, 10/15/2017    Influenza, High Dose (Fluzone 65 yrs and older) 08/25/2018    Influenza, Quadv, IM, PF (6 mo and older Fluzone, Flulaval, Fluarix, and 3 yrs and older Afluria) 08/15/2017    Influenza, Triv, inactivated, subunit, adjuvanted, IM (Fluad 65 yrs and older) 08/25/2018    Pneumococcal Conjugate 13-valent ([id number]) 01/23/2019    Pneumococcal Polysaccharide ([id number]) 11/17/2017    Tdap (Boostrix, Adacel) 01/03/2012        Health Maintenance   Topic Date Due    Shingles Vaccine (1 of 2) 05/21/2003    Annual Wellness Visit (AWV)  05/21/2016    Flu vaccine (1) 09/01/2019    Diabetic microalbuminuria test  01/17/2020    Lipid screen  01/17/2020    Potassium monitoring  03/02/2020    Creatinine monitoring  03/02/2020    Diabetic foot exam  06/10/2020    A1C test (Diabetic or Prediabetic)  06/10/2020    Diabetic retinal exam  09/11/2020    DTaP/Tdap/Td vaccine (2 - Td) 01/03/2022    Pneumococcal 65+ years Vaccine (2 of 2 - PPSV23) 11/17/2022    Colon cancer screen colonoscopy  03/29/2028    Hepatitis C screen  Completed     Recommendations for Preventive Services Due: see orders and patient instructions/AVS.  . Recommended screening schedule for the next 5-10 years is provided to the patient in written form: see Patient Instructions/AVS.    Garry Alex was seen today for medicare awv.     Diagnoses and all orders for this visit:    Routine general medical examination at a Alert-The patient is alert, awake and responds to voice. The patient is oriented to time, place, and person. The triage nurse is able to obtain subjective information.

## 2023-06-23 NOTE — ED ADULT NURSE NOTE - OBJECTIVE STATEMENT
69 y/o F A&Ox4 with PMH of HTN. Pt presents to the ED c/o L wrist pain. Pt reports she tripped and fell on uneven concrete sidewalk and fell on her L hand. Pt endorses severe L wrist pain. Pt denies LOC, however, pt synopsized afterwards due to pain. Upon assessment, pt appears with deformity to L wrist; pt able to move fingers and endorses sensation. Denies chest pain, SOB, n/v/d, constipation, dizziness. IV access established. Patient placed on cardiac monitor due to hypotension in triage. Patient safety and comfort measures implemented.

## 2023-06-23 NOTE — ED PROVIDER NOTE - CARE PLAN
Principal Discharge DX:	Wrist fracture   1 Principal Discharge DX:	Wrist fracture  Goal:	left distal radius/ulna fracture

## 2023-06-23 NOTE — ED PROVIDER NOTE - PATIENT PORTAL LINK FT
You can access the FollowMyHealth Patient Portal offered by [facility] by registering at the following website: [email or web address]. By joining [employer]’s FollowMyHealth portal, you will also be able to view your health information using other applications (apps) compatible with our system.

## 2023-06-23 NOTE — ED ADULT NURSE REASSESSMENT NOTE - NS ED NURSE REASSESS COMMENT FT1
RN notified pt's O2 sat dropped to 79% on room air. RN placed patient on nonrebreather 15 L; O2 sat improved to 100%. MD Choi notified and at bedside. Pt transitioned to 4 L nasal cannula; pt now sating at 100%.

## 2023-06-24 PROBLEM — I10 ESSENTIAL (PRIMARY) HYPERTENSION: Chronic | Status: ACTIVE | Noted: 2023-06-23

## 2023-06-26 ENCOUNTER — NON-APPOINTMENT (OUTPATIENT)
Age: 68
End: 2023-06-26

## 2023-06-29 ENCOUNTER — APPOINTMENT (OUTPATIENT)
Dept: ORTHOPEDIC SURGERY | Facility: CLINIC | Age: 68
End: 2023-06-29
Payer: MEDICARE

## 2023-06-29 PROCEDURE — 99213 OFFICE O/P EST LOW 20 MIN: CPT

## 2023-06-29 NOTE — PHYSICAL EXAM
[de-identified] : The patient is sitting comfortably in the exam room. \par Left wrist.\par -Skin is intact, no swelling, no ecchymosis\par -Pronation , supination\par -Able to make a fist\par -Sensation is intact median, radial, ulnar, axillary nerves\par -Motor is intact median, radial, ulnar, axillary nerves\par -Hand is warm and well-perfused, Palpable radial and ulnar pulses\par  [de-identified] : \par ACC: [id number] EXAM: CT 3D RECONSTRUCT W [place] ORDERED BY: DEMARCUS TORO\par \par ACC: [id number] EXAM: CT WRIST ONLY LT ORDERED BY: DEMARCUS TORO\par \par PROCEDURE DATE: 06/23/2023\par \par \par \par INTERPRETATION: Exam Type: CT WRIST LEFT, CT 3D RECONSTRUCTION W WORKSTATION\par Exam Date and Time: 6/23/2023 9:18 AM\par Indication: Fracture\par Comparison: Numerous wrist radiographs from earlier in the day.\par \par TECHNIQUE:\par Multiplanar CT images of the left wrist were obtained without contrast. 3-D reconstructions were performed.\par \par FINDINGS:\par \par Status post reduction and splinting of a comminuted intra-articular distal radius fracture with improved alignment. Mildly displaced ulnar styloid fracture. Moderate to severe osteoarthritis of the first carpometacarpal joint. Soft tissue swelling around the wrist.\par \par IMPRESSION:\par Status post reduction and splinting of a comminuted intra-articular distal radius fracture with improved alignment. Mildly displaced ulnar styloid fracture. Soft tissue swelling around the wrist.\par \par --- End of Report ---\par \par \par \par \par \par IRMA RENTERIA DO; Attending Radiologist\par This document has been electronically signed. Jun 23 2023 9:35AM

## 2023-06-29 NOTE — DISCUSSION/SUMMARY
[de-identified] : 68-year-old woman with left wrist fracture, approximately 1 week out.\par -The risks and benefits of operative versus nonoperative management were discussed at length with the patient. The patient shows a good understanding of the injury and treatment options. They would like to move forward with nonoperative management. \par -Nonweightbearing left UE\par -Finger ROM exercises demonstrated.\par -Provided sling for comfort.\par -Follow-up in 1 weeks with xrays of the left wrist at that time.\par -Short arm cast will be applied in 1 week: 7/6/23.\par -All of the patient's questions and concerns were addressed.\par

## 2023-06-29 NOTE — HISTORY OF PRESENT ILLNESS
[de-identified] : SUHAS Us is a 68 y.o. RHD woman who presents to the office with a left wrist fracture sustained after a fall onto an outstretched left arm. She was seen in [facility] on 6/22/23 where x-rays and CT performed. She was placed in a splint. She denies pain at this time. She will occasionally take Ibuprofen for the pain with relief. Denies prior injury/trauma. \par \par PMH: HTN, HLD controlled with diet, denies smoking\par Retired

## 2023-07-06 ENCOUNTER — APPOINTMENT (OUTPATIENT)
Dept: ORTHOPEDIC SURGERY | Facility: CLINIC | Age: 68
End: 2023-07-06
Payer: MEDICARE

## 2023-07-06 VITALS — BODY MASS INDEX: 27.92 KG/M2 | HEIGHT: 67.5 IN | WEIGHT: 180 LBS

## 2023-07-06 PROCEDURE — 99213 OFFICE O/P EST LOW 20 MIN: CPT | Mod: 57

## 2023-07-06 PROCEDURE — 25600 CLTX DST RDL FX/EPHYS SEP WO: CPT | Mod: LT

## 2023-07-06 PROCEDURE — 73110 X-RAY EXAM OF WRIST: CPT | Mod: LT

## 2023-07-06 NOTE — HISTORY OF PRESENT ILLNESS
[de-identified] : SUHAS Us is a 68 y.o. RHD woman who presents to the office with a left wrist fracture sustained after a fall onto an outstretched left arm. She was seen in [facility] on 6/22/23 where x-rays and CT performed. She was placed in a splint. She denies pain at this time. She will occasionally take Ibuprofen for the pain with relief. Denies prior injury/trauma. \par \par PMH: HTN, HLD controlled with diet, denies smoking\par Retired

## 2023-07-06 NOTE — DISCUSSION/SUMMARY
[de-identified] : 68-year-old woman with left wrist fracture, approximately 2 week out, being treated nonoperatively. \par -The risks and benefits of operative versus nonoperative management were discussed at length with the patient. The patient shows a good understanding of the injury and treatment options. They are still unsure.\par -Nonweightbearing left UE\par -Finger ROM exercises demonstrated.\par -Refer for a second opinion with Dr. Cordoba\par -All of the patient's questions and concerns were addressed.\par

## 2023-07-06 NOTE — PHYSICAL EXAM
[de-identified] : The patient is sitting comfortably in the exam room. \par Left wrist.\par -Skin is intact, no swelling, no ecchymosis\par -Pronation , supination\par -Able to make a fist\par -Sensation is intact median, radial, ulnar, axillary nerves\par -Motor is intact median, radial, ulnar, axillary nerves\par -Hand is warm and well-perfused, Palpable radial and ulnar pulses\par  [de-identified] : Xrays of the left wrist were taken in the office today, 7/6/23.

## 2023-07-10 ENCOUNTER — TRANSCRIPTION ENCOUNTER (OUTPATIENT)
Age: 68
End: 2023-07-10

## 2023-07-11 ENCOUNTER — APPOINTMENT (OUTPATIENT)
Dept: ORTHOPEDIC SURGERY | Facility: CLINIC | Age: 68
End: 2023-07-11
Payer: MEDICARE

## 2023-07-11 PROCEDURE — 73110 X-RAY EXAM OF WRIST: CPT | Mod: LT

## 2023-07-11 PROCEDURE — 99204 OFFICE O/P NEW MOD 45 MIN: CPT

## 2023-07-12 ENCOUNTER — OUTPATIENT (OUTPATIENT)
Dept: OUTPATIENT SERVICES | Facility: HOSPITAL | Age: 68
LOS: 1 days | End: 2023-07-12
Payer: MEDICARE

## 2023-07-12 VITALS
HEART RATE: 72 BPM | RESPIRATION RATE: 18 BRPM | HEIGHT: 67 IN | WEIGHT: 179.9 LBS | TEMPERATURE: 99 F | OXYGEN SATURATION: 96 % | SYSTOLIC BLOOD PRESSURE: 142 MMHG | DIASTOLIC BLOOD PRESSURE: 88 MMHG

## 2023-07-12 DIAGNOSIS — Z96.642 PRESENCE OF LEFT ARTIFICIAL HIP JOINT: Chronic | ICD-10-CM

## 2023-07-12 DIAGNOSIS — Z96.641 PRESENCE OF RIGHT ARTIFICIAL HIP JOINT: Chronic | ICD-10-CM

## 2023-07-12 DIAGNOSIS — Z01.818 ENCOUNTER FOR OTHER PREPROCEDURAL EXAMINATION: ICD-10-CM

## 2023-07-12 DIAGNOSIS — Z90.721 ACQUIRED ABSENCE OF OVARIES, UNILATERAL: Chronic | ICD-10-CM

## 2023-07-12 DIAGNOSIS — S52.502A UNSPECIFIED FRACTURE OF THE LOWER END OF LEFT RADIUS, INITIAL ENCOUNTER FOR CLOSED FRACTURE: ICD-10-CM

## 2023-07-12 DIAGNOSIS — Z90.89 ACQUIRED ABSENCE OF OTHER ORGANS: Chronic | ICD-10-CM

## 2023-07-12 PROCEDURE — G0463: CPT

## 2023-07-12 RX ORDER — SODIUM CHLORIDE 9 MG/ML
1000 INJECTION, SOLUTION INTRAVENOUS
Refills: 0 | Status: DISCONTINUED | OUTPATIENT
Start: 2023-07-14 | End: 2023-07-28

## 2023-07-12 NOTE — H&P PST ADULT - HISTORY OF PRESENT ILLNESS
68yr old female who tripped while walking on foot path after leaving a concert 6/22/2023. Now coming in for ORIF left distal radius Hx of HTN. Hx of covid 12/2022 took Paxlovid only had cold symptoms.

## 2023-07-12 NOTE — H&P PST ADULT - NSICDXPASTMEDICALHX_GEN_ALL_CORE_FT
PAST MEDICAL HISTORY:  DJD (degenerative joint disease), lumbar     History of osteoarthritis     History of osteopenia     HTN (hypertension)     Tendonitis of elbow, right

## 2023-07-12 NOTE — H&P PST ADULT - NSICDXPASTSURGICALHX_GEN_ALL_CORE_FT
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

## 2023-07-13 ENCOUNTER — TRANSCRIPTION ENCOUNTER (OUTPATIENT)
Age: 68
End: 2023-07-13

## 2023-07-14 ENCOUNTER — OUTPATIENT (OUTPATIENT)
Dept: OUTPATIENT SERVICES | Facility: HOSPITAL | Age: 68
LOS: 1 days | End: 2023-07-14
Payer: MEDICARE

## 2023-07-14 ENCOUNTER — TRANSCRIPTION ENCOUNTER (OUTPATIENT)
Age: 68
End: 2023-07-14

## 2023-07-14 ENCOUNTER — APPOINTMENT (OUTPATIENT)
Dept: ORTHOPEDIC SURGERY | Facility: HOSPITAL | Age: 68
End: 2023-07-14

## 2023-07-14 VITALS
HEART RATE: 82 BPM | RESPIRATION RATE: 16 BRPM | OXYGEN SATURATION: 97 % | HEIGHT: 67 IN | DIASTOLIC BLOOD PRESSURE: 77 MMHG | SYSTOLIC BLOOD PRESSURE: 134 MMHG | TEMPERATURE: 98 F | WEIGHT: 179.9 LBS

## 2023-07-14 VITALS
HEART RATE: 77 BPM | SYSTOLIC BLOOD PRESSURE: 137 MMHG | OXYGEN SATURATION: 97 % | TEMPERATURE: 98 F | DIASTOLIC BLOOD PRESSURE: 76 MMHG | RESPIRATION RATE: 17 BRPM

## 2023-07-14 DIAGNOSIS — Z90.721 ACQUIRED ABSENCE OF OVARIES, UNILATERAL: Chronic | ICD-10-CM

## 2023-07-14 DIAGNOSIS — Z01.818 ENCOUNTER FOR OTHER PREPROCEDURAL EXAMINATION: ICD-10-CM

## 2023-07-14 DIAGNOSIS — Z96.641 PRESENCE OF RIGHT ARTIFICIAL HIP JOINT: Chronic | ICD-10-CM

## 2023-07-14 DIAGNOSIS — Z96.642 PRESENCE OF LEFT ARTIFICIAL HIP JOINT: Chronic | ICD-10-CM

## 2023-07-14 DIAGNOSIS — S52.502A UNSPECIFIED FRACTURE OF THE LOWER END OF LEFT RADIUS, INITIAL ENCOUNTER FOR CLOSED FRACTURE: ICD-10-CM

## 2023-07-14 DIAGNOSIS — Z90.89 ACQUIRED ABSENCE OF OTHER ORGANS: Chronic | ICD-10-CM

## 2023-07-14 PROCEDURE — C1713: CPT

## 2023-07-14 PROCEDURE — 76000 FLUOROSCOPY <1 HR PHYS/QHP: CPT

## 2023-07-14 PROCEDURE — 25609 OPTX DST RD XART FX/EP SEP3+: CPT | Mod: LT

## 2023-07-14 PROCEDURE — 25608 OPTX DST RD XART FX/EPI SEP2: CPT | Mod: LT

## 2023-07-14 DEVICE — SCREW CORT 2.5X15MM: Type: IMPLANTABLE DEVICE | Site: LEFT | Status: FUNCTIONAL

## 2023-07-14 DEVICE — SCREW CORT 2.5X14MM: Type: IMPLANTABLE DEVICE | Site: LEFT | Status: FUNCTIONAL

## 2023-07-14 DEVICE — SCREW TRILOCK 2.5X18MM: Type: IMPLANTABLE DEVICE | Site: LEFT | Status: FUNCTIONAL

## 2023-07-14 DEVICE — PLATE TRILOCK DIST RAD VOLAR LT: Type: IMPLANTABLE DEVICE | Site: LEFT | Status: FUNCTIONAL

## 2023-07-14 DEVICE — SCREW CORT 2.5X12MM: Type: IMPLANTABLE DEVICE | Site: LEFT | Status: FUNCTIONAL

## 2023-07-14 DEVICE — SCREW TRILOCK 2.5X22MM: Type: IMPLANTABLE DEVICE | Site: LEFT | Status: FUNCTIONAL

## 2023-07-14 RX ORDER — CHOLECALCIFEROL (VITAMIN D3) 125 MCG
1 CAPSULE ORAL
Refills: 0 | DISCHARGE

## 2023-07-14 RX ORDER — AMLODIPINE BESYLATE 2.5 MG/1
1 TABLET ORAL
Refills: 0 | DISCHARGE

## 2023-07-14 RX ORDER — OXYCODONE 5 MG/1
5 TABLET ORAL
Qty: 10 | Refills: 0 | Status: ACTIVE | COMMUNITY
Start: 2023-07-14 | End: 1900-01-01

## 2023-07-14 RX ORDER — FENTANYL CITRATE 50 UG/ML
25 INJECTION INTRAVENOUS
Refills: 0 | Status: DISCONTINUED | OUTPATIENT
Start: 2023-07-14 | End: 2023-07-14

## 2023-07-14 RX ORDER — CEFAZOLIN SODIUM 1 G
2000 VIAL (EA) INJECTION ONCE
Refills: 0 | Status: COMPLETED | OUTPATIENT
Start: 2023-07-14 | End: 2023-07-14

## 2023-07-14 RX ORDER — ONDANSETRON 8 MG/1
4 TABLET, FILM COATED ORAL ONCE
Refills: 0 | Status: DISCONTINUED | OUTPATIENT
Start: 2023-07-14 | End: 2023-07-28

## 2023-07-14 RX ORDER — LIDOCAINE HCL 20 MG/ML
0.2 VIAL (ML) INJECTION ONCE
Refills: 0 | Status: COMPLETED | OUTPATIENT
Start: 2023-07-14 | End: 2023-07-14

## 2023-07-14 RX ORDER — ACETAMINOPHEN 500 MG
2 TABLET ORAL
Refills: 0 | DISCHARGE

## 2023-07-14 RX ADMIN — SODIUM CHLORIDE 100 MILLILITER(S): 9 INJECTION, SOLUTION INTRAVENOUS at 10:12

## 2023-07-14 NOTE — ASU DISCHARGE PLAN (ADULT/PEDIATRIC) - NURSING INSTRUCTIONS
Tylenol/acetaminophen------AND/OR------Motrin/ibuprofen  as needed for pain/discomfort.  Please read and follow preprinted, MD-specific instruction sheets provided, (purple pamphlet).  Discontinue using sling when block wears off.

## 2023-07-14 NOTE — ASU DISCHARGE PLAN (ADULT/PEDIATRIC) - CARE PROVIDER_API CALL
Nell Cordoba  Surgery of the Hand  [address], [address]  [place] [address]  Phone: [phone]  Fax: [phone]  Follow Up Time: 1 week

## 2023-07-14 NOTE — ASU DISCHARGE PLAN (ADULT/PEDIATRIC) - NS MD DC FALL RISK RISK
For information on Fall & Injury Prevention, visit: [email or web address] OR  [email or web address] OR  https://www.cdc.gov/steadi/patient.html

## 2023-07-14 NOTE — PRE-ANESTHESIA EVALUATION ADULT - NSANTHASARD_GEN_ALL_CORE
12/24/2022          To Whom It May Concern:    Jose Robles had a PPD (tuberculin skin test) placed on the left forearm on 12/22/2022. The results were read on 12/24/2022 by Bertha Rivera CNP    RESULTS:  0 mm induration noted    Sincerely,        Bertha Rivera CNP  [facility] at [address] [address]  Dept Phone: [phone]             2

## 2023-07-27 ENCOUNTER — APPOINTMENT (OUTPATIENT)
Dept: ORTHOPEDIC SURGERY | Facility: CLINIC | Age: 68
End: 2023-07-27
Payer: MEDICARE

## 2023-07-27 PROBLEM — M77.8 OTHER ENTHESOPATHIES, NOT ELSEWHERE CLASSIFIED: Chronic | Status: ACTIVE | Noted: 2023-07-12

## 2023-07-27 PROBLEM — Z87.39 PERSONAL HISTORY OF OTHER DISEASES OF THE MUSCULOSKELETAL SYSTEM AND CONNECTIVE TISSUE: Chronic | Status: ACTIVE | Noted: 2023-07-12

## 2023-07-27 PROBLEM — M47.816 SPONDYLOSIS WITHOUT MYELOPATHY OR RADICULOPATHY, LUMBAR REGION: Chronic | Status: ACTIVE | Noted: 2023-07-12

## 2023-07-27 PROCEDURE — 73110 X-RAY EXAM OF WRIST: CPT | Mod: LT

## 2023-07-27 PROCEDURE — 99024 POSTOP FOLLOW-UP VISIT: CPT

## 2023-08-29 ENCOUNTER — APPOINTMENT (OUTPATIENT)
Dept: ORTHOPEDIC SURGERY | Facility: CLINIC | Age: 68
End: 2023-08-29
Payer: MEDICARE

## 2023-08-29 PROCEDURE — 99024 POSTOP FOLLOW-UP VISIT: CPT

## 2023-08-29 PROCEDURE — 73110 X-RAY EXAM OF WRIST: CPT | Mod: LT

## 2023-09-28 ENCOUNTER — APPOINTMENT (OUTPATIENT)
Dept: ORTHOPEDIC SURGERY | Facility: CLINIC | Age: 68
End: 2023-09-28
Payer: MEDICARE

## 2023-09-28 DIAGNOSIS — S52.502A UNSPECIFIED FRACTURE OF THE LOWER END OF LEFT RADIUS, INITIAL ENCOUNTER FOR CLOSED FRACTURE: ICD-10-CM

## 2023-09-28 PROCEDURE — 99024 POSTOP FOLLOW-UP VISIT: CPT

## 2023-10-16 ENCOUNTER — APPOINTMENT (OUTPATIENT)
Dept: OBGYN | Facility: CLINIC | Age: 68
End: 2023-10-16
Payer: MEDICARE

## 2023-10-16 PROCEDURE — 82270 OCCULT BLOOD FECES: CPT

## 2023-10-16 PROCEDURE — 99213 OFFICE O/P EST LOW 20 MIN: CPT | Mod: 25

## 2023-10-16 PROCEDURE — 81002 URINALYSIS NONAUTO W/O SCOPE: CPT

## 2024-07-11 ENCOUNTER — APPOINTMENT (OUTPATIENT)
Dept: ORTHOPEDIC SURGERY | Facility: CLINIC | Age: 69
End: 2024-07-11
Payer: MEDICARE

## 2024-07-11 DIAGNOSIS — S52.502A UNSPECIFIED FRACTURE OF THE LOWER END OF LEFT RADIUS, INITIAL ENCOUNTER FOR CLOSED FRACTURE: ICD-10-CM

## 2024-07-11 PROCEDURE — 73110 X-RAY EXAM OF WRIST: CPT | Mod: LT

## 2024-07-11 PROCEDURE — 99213 OFFICE O/P EST LOW 20 MIN: CPT | Mod: 25

## 2024-11-12 ENCOUNTER — APPOINTMENT (OUTPATIENT)
Dept: OBGYN | Facility: CLINIC | Age: 69
End: 2024-11-12

## 2024-11-12 PROCEDURE — 81002 URINALYSIS NONAUTO W/O SCOPE: CPT

## 2024-11-12 PROCEDURE — 99213 OFFICE O/P EST LOW 20 MIN: CPT | Mod: 25

## 2024-11-12 PROCEDURE — G0101: CPT

## 2024-11-12 PROCEDURE — 82270 OCCULT BLOOD FECES: CPT

## 2025-04-26 ENCOUNTER — NON-APPOINTMENT (OUTPATIENT)
Age: 70
End: 2025-04-26

## 2025-04-29 ENCOUNTER — APPOINTMENT (OUTPATIENT)
Dept: ORTHOPEDIC SURGERY | Facility: CLINIC | Age: 70
End: 2025-04-29
Payer: MEDICARE

## 2025-04-29 VITALS
WEIGHT: 185 LBS | HEART RATE: 76 BPM | DIASTOLIC BLOOD PRESSURE: 83 MMHG | BODY MASS INDEX: 29.03 KG/M2 | HEIGHT: 67 IN | SYSTOLIC BLOOD PRESSURE: 135 MMHG

## 2025-04-29 DIAGNOSIS — Z96.642 PRESENCE OF LEFT ARTIFICIAL HIP JOINT: ICD-10-CM

## 2025-04-29 DIAGNOSIS — M70.62 TROCHANTERIC BURSITIS, LEFT HIP: ICD-10-CM

## 2025-04-29 DIAGNOSIS — M51.26 OTHER INTERVERTEBRAL DISC DISPLACEMENT, LUMBAR REGION: ICD-10-CM

## 2025-04-29 DIAGNOSIS — Z96.641 PRESENCE OF RIGHT ARTIFICIAL HIP JOINT: ICD-10-CM

## 2025-04-29 PROCEDURE — 20610 DRAIN/INJ JOINT/BURSA W/O US: CPT | Mod: LT

## 2025-04-29 PROCEDURE — 99214 OFFICE O/P EST MOD 30 MIN: CPT | Mod: 25

## 2025-04-29 PROCEDURE — 73522 X-RAY EXAM HIPS BI 3-4 VIEWS: CPT

## 2025-04-29 RX ORDER — AMOXICILLIN 500 MG/1
500 TABLET, FILM COATED ORAL
Qty: 20 | Refills: 0 | Status: ACTIVE | COMMUNITY
Start: 2025-04-29 | End: 1900-01-01

## 2025-04-29 RX ORDER — IBUPROFEN 600 MG/1
600 TABLET, FILM COATED ORAL
Qty: 90 | Refills: 0 | Status: ACTIVE | COMMUNITY
Start: 2025-04-29 | End: 1900-01-01

## 2025-07-21 ENCOUNTER — APPOINTMENT (OUTPATIENT)
Dept: ORTHOPEDIC SURGERY | Facility: CLINIC | Age: 70
End: 2025-07-21
Payer: MEDICARE

## 2025-07-21 VITALS
SYSTOLIC BLOOD PRESSURE: 110 MMHG | DIASTOLIC BLOOD PRESSURE: 68 MMHG | WEIGHT: 183 LBS | HEIGHT: 67 IN | BODY MASS INDEX: 28.72 KG/M2 | HEART RATE: 80 BPM

## 2025-07-21 DIAGNOSIS — M51.369: ICD-10-CM

## 2025-07-21 DIAGNOSIS — M41.9 SCOLIOSIS, UNSPECIFIED: ICD-10-CM

## 2025-07-21 PROCEDURE — 99214 OFFICE O/P EST MOD 30 MIN: CPT

## 2025-08-13 ENCOUNTER — NON-APPOINTMENT (OUTPATIENT)
Age: 70
End: 2025-08-13

## (undated) DEVICE — VENODYNE/SCD SLEEVE CALF LARGE

## (undated) DEVICE — DRILL BIT MEDARTIS TWIST 2X40X91MM

## (undated) DEVICE — POSITIONER FOAM EGG CRATE ULNAR 2PCS (PINK)

## (undated) DEVICE — SPECIMEN CONTAINER 100ML

## (undated) DEVICE — SUT MONOCRYL 4-0 18" P-3

## (undated) DEVICE — BLADE SURGICAL #15 CARBON

## (undated) DEVICE — DRSG WEBRIL 4"

## (undated) DEVICE — DRSG COBAN 4"

## (undated) DEVICE — DRSG ACE BANDAGE 4" NS

## (undated) DEVICE — DRSG KLING 3"

## (undated) DEVICE — SOL IRR POUR H2O 250ML

## (undated) DEVICE — DRSG CAST PLASTER 3" (BLUE)

## (undated) DEVICE — TOURNIQUET CUFF 18" DUAL PORT DUAL BLADDER W PLC (BLACK)

## (undated) DEVICE — SOL IRR POUR NS 0.9% 500ML

## (undated) DEVICE — PACK HAND

## (undated) DEVICE — DRSG CAST PLASTER XFAST 3"

## (undated) DEVICE — TOURNIQUET CUFF 24" DUAL PORT DUAL BLADDER W PLC (BLACK)

## (undated) DEVICE — SUT POLYSORB 3-0 18" P-12 UNDYED

## (undated) DEVICE — WARMING BLANKET LOWER ADULT

## (undated) DEVICE — GLV 7 PROTEXIS (WHITE)

## (undated) DEVICE — DRAPE C ARM MINI PACK FOR 6800

## (undated) DEVICE — PREP CHLORAPREP HI-LITE ORANGE 26ML